# Patient Record
Sex: FEMALE | Race: WHITE | HISPANIC OR LATINO | Employment: FULL TIME | URBAN - METROPOLITAN AREA
[De-identification: names, ages, dates, MRNs, and addresses within clinical notes are randomized per-mention and may not be internally consistent; named-entity substitution may affect disease eponyms.]

---

## 2019-01-17 ENCOUNTER — APPOINTMENT (OUTPATIENT)
Dept: RADIOLOGY | Facility: MEDICAL CENTER | Age: 29
End: 2019-01-17

## 2019-01-17 ENCOUNTER — HOSPITAL ENCOUNTER (EMERGENCY)
Facility: MEDICAL CENTER | Age: 29
End: 2019-01-17
Attending: EMERGENCY MEDICINE

## 2019-01-17 VITALS
RESPIRATION RATE: 16 BRPM | SYSTOLIC BLOOD PRESSURE: 121 MMHG | BODY MASS INDEX: 24.71 KG/M2 | TEMPERATURE: 97.3 F | HEART RATE: 89 BPM | HEIGHT: 67 IN | WEIGHT: 157.41 LBS | OXYGEN SATURATION: 100 % | DIASTOLIC BLOOD PRESSURE: 66 MMHG

## 2019-01-17 DIAGNOSIS — R10.30 LOWER ABDOMINAL PAIN: ICD-10-CM

## 2019-01-17 DIAGNOSIS — O23.42 URINARY TRACT INFECTION IN PREGNANCY IN SECOND TRIMESTER, ANTEPARTUM: ICD-10-CM

## 2019-01-17 DIAGNOSIS — Z3A.17 17 WEEKS GESTATION OF PREGNANCY: ICD-10-CM

## 2019-01-17 LAB
ALBUMIN SERPL BCP-MCNC: 3.8 G/DL (ref 3.2–4.9)
ALBUMIN/GLOB SERPL: 1.1 G/DL
ALP SERPL-CCNC: 47 U/L (ref 30–99)
ALT SERPL-CCNC: 13 U/L (ref 2–50)
ANION GAP SERPL CALC-SCNC: 5 MMOL/L (ref 0–11.9)
APPEARANCE UR: ABNORMAL
AST SERPL-CCNC: 16 U/L (ref 12–45)
B-HCG SERPL-ACNC: ABNORMAL MIU/ML (ref 0–5)
BACTERIA #/AREA URNS HPF: ABNORMAL /HPF
BASOPHILS # BLD AUTO: 0.3 % (ref 0–1.8)
BASOPHILS # BLD: 0.03 K/UL (ref 0–0.12)
BILIRUB SERPL-MCNC: 0.3 MG/DL (ref 0.1–1.5)
BILIRUB UR QL STRIP.AUTO: NEGATIVE
BUN SERPL-MCNC: 10 MG/DL (ref 8–22)
CALCIUM SERPL-MCNC: 8.9 MG/DL (ref 8.5–10.5)
CHLORIDE SERPL-SCNC: 106 MMOL/L (ref 96–112)
CO2 SERPL-SCNC: 22 MMOL/L (ref 20–33)
COLOR UR: YELLOW
CREAT SERPL-MCNC: 0.62 MG/DL (ref 0.5–1.4)
EOSINOPHIL # BLD AUTO: 0.12 K/UL (ref 0–0.51)
EOSINOPHIL NFR BLD: 1.2 % (ref 0–6.9)
EPI CELLS #/AREA URNS HPF: ABNORMAL /HPF
ERYTHROCYTE [DISTWIDTH] IN BLOOD BY AUTOMATED COUNT: 48.4 FL (ref 35.9–50)
GLOBULIN SER CALC-MCNC: 3.4 G/DL (ref 1.9–3.5)
GLUCOSE SERPL-MCNC: 77 MG/DL (ref 65–99)
GLUCOSE UR STRIP.AUTO-MCNC: NEGATIVE MG/DL
HCT VFR BLD AUTO: 36.5 % (ref 37–47)
HGB BLD-MCNC: 11.8 G/DL (ref 12–16)
HYALINE CASTS #/AREA URNS LPF: ABNORMAL /LPF
IMM GRANULOCYTES # BLD AUTO: 0.04 K/UL (ref 0–0.11)
IMM GRANULOCYTES NFR BLD AUTO: 0.4 % (ref 0–0.9)
KETONES UR STRIP.AUTO-MCNC: NEGATIVE MG/DL
LEUKOCYTE ESTERASE UR QL STRIP.AUTO: ABNORMAL
LIPASE SERPL-CCNC: 34 U/L (ref 11–82)
LYMPHOCYTES # BLD AUTO: 2.07 K/UL (ref 1–4.8)
LYMPHOCYTES NFR BLD: 21 % (ref 22–41)
MCH RBC QN AUTO: 26.5 PG (ref 27–33)
MCHC RBC AUTO-ENTMCNC: 32.3 G/DL (ref 33.6–35)
MCV RBC AUTO: 81.8 FL (ref 81.4–97.8)
MICRO URNS: ABNORMAL
MONOCYTES # BLD AUTO: 0.9 K/UL (ref 0–0.85)
MONOCYTES NFR BLD AUTO: 9.1 % (ref 0–13.4)
NEUTROPHILS # BLD AUTO: 6.7 K/UL (ref 2–7.15)
NEUTROPHILS NFR BLD: 68 % (ref 44–72)
NITRITE UR QL STRIP.AUTO: POSITIVE
NRBC # BLD AUTO: 0 K/UL
NRBC BLD-RTO: 0 /100 WBC
PH UR STRIP.AUTO: 6.5 [PH]
PLATELET # BLD AUTO: 195 K/UL (ref 164–446)
PMV BLD AUTO: 11.5 FL (ref 9–12.9)
POTASSIUM SERPL-SCNC: 4 MMOL/L (ref 3.6–5.5)
PROT SERPL-MCNC: 7.2 G/DL (ref 6–8.2)
PROT UR QL STRIP: NEGATIVE MG/DL
RBC # BLD AUTO: 4.46 M/UL (ref 4.2–5.4)
RBC # URNS HPF: ABNORMAL /HPF
RBC UR QL AUTO: NEGATIVE
SODIUM SERPL-SCNC: 133 MMOL/L (ref 135–145)
SP GR UR STRIP.AUTO: 1.02
UROBILINOGEN UR STRIP.AUTO-MCNC: 0.2 MG/DL
WBC # BLD AUTO: 9.9 K/UL (ref 4.8–10.8)
WBC #/AREA URNS HPF: ABNORMAL /HPF

## 2019-01-17 PROCEDURE — 80053 COMPREHEN METABOLIC PANEL: CPT

## 2019-01-17 PROCEDURE — 76815 OB US LIMITED FETUS(S): CPT

## 2019-01-17 PROCEDURE — 700102 HCHG RX REV CODE 250 W/ 637 OVERRIDE(OP): Performed by: EMERGENCY MEDICINE

## 2019-01-17 PROCEDURE — 83690 ASSAY OF LIPASE: CPT

## 2019-01-17 PROCEDURE — 81001 URINALYSIS AUTO W/SCOPE: CPT

## 2019-01-17 PROCEDURE — 99284 EMERGENCY DEPT VISIT MOD MDM: CPT

## 2019-01-17 PROCEDURE — 85025 COMPLETE CBC W/AUTO DIFF WBC: CPT

## 2019-01-17 PROCEDURE — 84702 CHORIONIC GONADOTROPIN TEST: CPT

## 2019-01-17 PROCEDURE — 87077 CULTURE AEROBIC IDENTIFY: CPT

## 2019-01-17 PROCEDURE — 87186 SC STD MICRODIL/AGAR DIL: CPT

## 2019-01-17 PROCEDURE — 87086 URINE CULTURE/COLONY COUNT: CPT

## 2019-01-17 PROCEDURE — A9270 NON-COVERED ITEM OR SERVICE: HCPCS | Performed by: EMERGENCY MEDICINE

## 2019-01-17 RX ORDER — NITROFURANTOIN 25; 75 MG/1; MG/1
100 CAPSULE ORAL 2 TIMES DAILY
Qty: 14 CAP | Refills: 0 | Status: SHIPPED | OUTPATIENT
Start: 2019-01-17 | End: 2019-01-24

## 2019-01-17 RX ORDER — CEPHALEXIN 500 MG/1
500 CAPSULE ORAL ONCE
Status: DISCONTINUED | OUTPATIENT
Start: 2019-01-17 | End: 2019-01-17

## 2019-01-17 RX ORDER — NITROFURANTOIN 25; 75 MG/1; MG/1
100 CAPSULE ORAL ONCE
Status: COMPLETED | OUTPATIENT
Start: 2019-01-17 | End: 2019-01-17

## 2019-01-17 RX ADMIN — NITROFURANTOIN MONOHYDRATE/MACROCRYSTALLINE 100 MG: 25; 75 CAPSULE ORAL at 20:53

## 2019-01-17 NOTE — LETTER
1/19/2019               Jessa Parsons  [id number]  [place] [address]        Dear Jessa (MR#[id number])    This letter is sent in regards to your, recent visit to the [facility] Emergency Department on 1/17/2019.  During the visit, tests were performed to assist the physician in a medical diagnosis.  A review of those tests requires that we notify you of the following:    Your urine culture was POSITIVE for a bacteria called Escherichia coli. The antibiotic prescribed for you (nitrofurantoin) should be active to treat this bacteria. IT IS IMPORTANT THAT YOU CONTINUE TAKING YOUR ANTIBIOTIC UNTIL IT IS FINISHED.      Please feel free to contact me at the number below if you have any questions or concerns. Thank you for your cooperation in the matter.    Sincerely,  ED Culture Follow-Up Staff  Sachin Borjas, PharmD    [facility], Emergency Department  [address] [place] [id number]  [phone] (ED Culture Line)  [phone]

## 2019-01-18 NOTE — ED PROVIDER NOTES
ED Provider Note    Scribed for Ji Flores M.D. by Joshua Nix. 2019  8:04 PM    Means of arrival: Walk in  History obtained from: Patient  History limited by: None    CHIEF COMPLAINT  Chief Complaint   Patient presents with   • Abdominal Cramping     since last night    • Pregnancy     positive home pregnancy test 2 days ago        HPI    Jessa Parsons is a 28 y.o. female A0 17 weeks pregnant by dates presenting with intermittent lower abdominal cramping onset yesterday. She states the episodes of pain last approximately 4 hours and the pain is a 4/10 in severity. The patient additionally reports to have had right lower back pain yesterday, but it has since resolved. No alleviating or exacerbating factors are identified. She denies associated dysuria, vaginal bleeding, vaginal discharge, vomiting, diarrhea, fever, or rash. The patient recently found out she was pregnant 3 days ago after she had a positive at home pregnancy test. She has received prenatal care yet, but states her last menstrual period was 18. The patient reports no complications with prior pregnancies.     REVIEW OF SYSTEMS  See HPI for further details.   Pertinent positives include: lower abdominal cramping.  Pertinent negative include: dysuria, vaginal bleeding, vaginal discharge, vomiting, diarrhea, fever, or rash.  10 + review of systems otherwise negative     PAST MEDICAL HISTORY   Anemia    SOCIAL HISTORY  Social History     Social History Main Topics   • Smoking status: Never Smoker   • Smokeless tobacco: Never Used   • Alcohol use No   • Drug use: No       SURGICAL HISTORY  patient denies any surgical history    CURRENT MEDICATIONS  Home Medications     Reviewed by Dorothy Syed R.N. (Registered Nurse) on 19 at 1618  Med List Status: Complete   Medication Last Dose Status        Patient Raciel Taking any Medications                       ALLERGIES  No Known Allergies    PHYSICAL EXAM  VITAL SIGNS: /63    "Pulse 93   Temp 36.3 °C (97.3 °F) (Temporal)   Resp 16   Ht 1.702 m (5' 7\")   Wt 71.4 kg (157 lb 6.5 oz)   SpO2 100%   BMI 24.65 kg/m²    SpO2: I interpret this pulse oximetry as normal  Constitutional: Well developed, Well nourished, No distress, Non-toxic appearance.   HENT: Normocephalic, Atraumatic, Bilateral external ears normal, Oropharynx moist, No oral exudates.   Eyes: PERRLA, EOMI, Conjunctiva normal, No discharge.   Neck: No tenderness, Supple, No stridor.   Lymphatic: No lymphadenopathy noted.   Cardiovascular: Normal heart rate, Normal rhythm.   Thorax & Lungs: Clear to auscultation bilaterally, No respiratory distress, No wheezing, No crackles.   Abdomen: Soft, Slight lower abdominal tenderness, No masses, No pulsatile masses, no peritoneal signs.   Skin: Warm, Dry, No erythema, No rash.   Extremities:, No edema No cyanosis.   Musculoskeletal: No tenderness to palpation or major deformities noted.  Intact distal pulses  Neurologic: Awake, alert. Moves all extremities spontaneously.  Psychiatric: Affect normal, Judgment normal, Mood normal.     DIAGNOSTIC STUDIES / PROCEDURES    LABORATORY  Results for orders placed or performed during the hospital encounter of 01/17/19   CBC WITH DIFFERENTIAL   Result Value Ref Range    WBC 9.9 4.8 - 10.8 K/uL    RBC 4.46 4.20 - 5.40 M/uL    Hemoglobin 11.8 (L) 12.0 - 16.0 g/dL    Hematocrit 36.5 (L) 37.0 - 47.0 %    MCV 81.8 81.4 - 97.8 fL    MCH 26.5 (L) 27.0 - 33.0 pg    MCHC 32.3 (L) 33.6 - 35.0 g/dL    RDW 48.4 35.9 - 50.0 fL    Platelet Count 195 164 - 446 K/uL    MPV 11.5 9.0 - 12.9 fL    Neutrophils-Polys 68.00 44.00 - 72.00 %    Lymphocytes 21.00 (L) 22.00 - 41.00 %    Monocytes 9.10 0.00 - 13.40 %    Eosinophils 1.20 0.00 - 6.90 %    Basophils 0.30 0.00 - 1.80 %    Immature Granulocytes 0.40 0.00 - 0.90 %    Nucleated RBC 0.00 /100 WBC    Neutrophils (Absolute) 6.70 2.00 - 7.15 K/uL    Lymphs (Absolute) 2.07 1.00 - 4.80 K/uL    Monos (Absolute) 0.90 (H) " 0.00 - 0.85 K/uL    Eos (Absolute) 0.12 0.00 - 0.51 K/uL    Baso (Absolute) 0.03 0.00 - 0.12 K/uL    Immature Granulocytes (abs) 0.04 0.00 - 0.11 K/uL    NRBC (Absolute) 0.00 K/uL   COMP METABOLIC PANEL   Result Value Ref Range    Sodium 133 (L) 135 - 145 mmol/L    Potassium 4.0 3.6 - 5.5 mmol/L    Chloride 106 96 - 112 mmol/L    Co2 22 20 - 33 mmol/L    Anion Gap 5.0 0.0 - 11.9    Glucose 77 65 - 99 mg/dL    Bun 10 8 - 22 mg/dL    Creatinine 0.62 0.50 - 1.40 mg/dL    Calcium 8.9 8.5 - 10.5 mg/dL    AST(SGOT) 16 12 - 45 U/L    ALT(SGPT) 13 2 - 50 U/L    Alkaline Phosphatase 47 30 - 99 U/L    Total Bilirubin 0.3 0.1 - 1.5 mg/dL    Albumin 3.8 3.2 - 4.9 g/dL    Total Protein 7.2 6.0 - 8.2 g/dL    Globulin 3.4 1.9 - 3.5 g/dL    A-G Ratio 1.1 g/dL   LIPASE   Result Value Ref Range    Lipase 34 11 - 82 U/L   HCG QUANTITATIVE   Result Value Ref Range    Bhcg 70265.8 (H) 0.0 - 5.0 mIU/mL   URINALYSIS,CULTURE IF INDICATED   Result Value Ref Range    Color Yellow     Character Cloudy (A)     Specific Gravity 1.020 <1.035    Ph 6.5 5.0 - 8.0    Glucose Negative Negative mg/dL    Ketones Negative Negative mg/dL    Protein Negative Negative mg/dL    Bilirubin Negative Negative    Urobilinogen, Urine 0.2 Negative    Nitrite Positive (A) Negative    Leukocyte Esterase Moderate (A) Negative    Occult Blood Negative Negative    Micro Urine Req Microscopic    ESTIMATED GFR   Result Value Ref Range    GFR If African American >60 >60 mL/min/1.73 m 2    GFR If Non African American >60 >60 mL/min/1.73 m 2   URINE MICROSCOPIC (W/UA)   Result Value Ref Range    WBC 20-50 (A) /hpf    RBC 0-2 /hpf    Bacteria Many (A) None /hpf    Epithelial Cells Moderate (A) /hpf    Hyaline Cast 3-5 (A) /lpf       RADIOLOGY    US-OB LIMITED TRANSABDOMINAL   Final Result      Single intrauterine pregnancy of an estimated gestational age of 17 weeks 1 day with an estimated date of delivery of 6/26/2019. Clinical CAYLA is 7/2/2019      Fetal survey within  "normal limits.              CHART REVIEW  Pertinent medical chart information was reviewed and reveals: No recent pertinent encounters    COURSE & MEDICAL DECISION MAKING  Pertinent Labs & Imaging studies reviewed. (See chart for details)    Differential diagnoses include but not limited to: UTI, pyonephritis, renal stone, gastroenteritis, ovarian torsion, ovarian cyst, pregnancy, ectopic pregnancy    8:04 PM - Patient seen and examined at bedside. Discussed plan of care, including lab and imaging. Patient agrees to the plan of care. Ordered for US-OB Transabdominal, Estimated GFR, CBC with differential, CMP, Lipase, HCG Qual, and Urinalysis to evaluate her symptoms. She is informed lab work thus far is reassuring, but will continue to wait for rest of labs to result.    8:51 PM - Patient reevaluated.  She is resting comfortably in bed with stable vital signs. The patient was updated with remaining lab results that indicate UTI. She will be discharged home with prescription for Macrobid. The patient is additionally recommended to start taking prenatal vitamins, and will be given referral to pregnancy center. She is instructed to return for vaginal bleeding, worsening abdominal pain, or any other concerning symptoms. Patient is understanding and agreeable to discharge.     Vitals:    01/17/19 1609 01/17/19 1616 01/17/19 1809 01/17/19 2100   BP: 115/56  115/63 121/66   Pulse: (!) 106  93 89   Resp: 18  16 16   Temp: 36.3 °C (97.3 °F)      TempSrc: Temporal      SpO2: 95%  100%    Weight:  71.4 kg (157 lb 6.5 oz)     Height: 1.702 m (5' 7\")          Medications   nitrofurantoin monohydr macro (MACROBID) capsule CAPS 100 mg (100 mg Oral Given 1/17/19 2053)       28-year-old previous healthy female presenting for abdominal cramping, intermittent.  No current GI/ symptoms.  Vital signs and exam are reassuring, no signs of acute surgical abdomen, no specific pain on exam.  There is a test is not positive.  Ultrasound " shows normal pregnancy, approximately 17 weeks.  Patient states she is chronically taking prenatal vitamins for chronic anemia.  Did not know she was pregnant.  Urinalysis shows signs of infection.  Given dose of antibiotics here.  No evidence of pyelonephritis at this time.  Patient tolerating p.o. and feeling well.  No significant pain during course.  Does states that she was taking oral contraceptives, strongly advised her to discontinue this, is very knowledged on prenatal care from previous pregnancies.  Currently without any vaginal bleeding, no evidence of sepsis pyonephritis, comfortable and well-appearing.  Left message with outpatient  for pregnancy center follow-up.  Patient or guardian given strict returns precautions and care instructions.  Advised PCP follow-up in 1-2 days.  Patient or guardian expresses understanding and agrees to plan.    DISPOSITION  The patient will return for new or worsening symptoms and is stable at the time of discharge.    DISPOSITION:  Patient will be discharged home in stable condition.    FOLLOW UP:  [facility]  [address] [place] [id number]  [phone]  Schedule an appointment as soon as possible for a visit in 1 day  You should receive a call from our outpatient  in the next day, please call the pregnancy center if you do not receive a phone call tomorrow.  Return to the emergency department for any new or worsening symptoms.      OUTPATIENT MEDICATIONS:  Discharge Medication List as of 1/17/2019  8:55 PM      START taking these medications    Details   nitrofurantoin monohydr macro (MACROBID) 100 MG Cap Take 1 Cap by mouth 2 times a day for 7 days., Disp-14 Cap, R-0, Print Rx Paper             FINAL IMPRESSION  Visit Diagnoses     ICD-10-CM   1. 17 weeks gestation of pregnancy Z3A.17   2. Lower abdominal pain R10.30   3. Urinary tract infection in pregnancy in second trimester, antepartum O23.42        IJoshua (Scribanette),  am scribing for, and in the presence of, Ji Flores M.D..    Electronically signed by: Joshua Nix (Scribe), 1/17/2019    IJi M.D. personally performed the services described in this documentation, as scribed by Joshua Nix in my presence, and it is both accurate and complete.    C.    The note accurately reflects work and decisions made by me.  Ji Flores  1/18/2019  1:01 AM

## 2019-01-18 NOTE — ED TRIAGE NOTES
Pt to triage .  Chief Complaint   Patient presents with   • Abdominal Cramping     since last night    • Pregnancy     positive home pregnancy test 2 days ago

## 2019-01-18 NOTE — ED NOTES
Pt ambulated from waiting room without difficulty. States that her abd pain is still present. All tests already done, chart up for ERP eval

## 2019-01-19 LAB
BACTERIA UR CULT: ABNORMAL
BACTERIA UR CULT: ABNORMAL
SIGNIFICANT IND 70042: ABNORMAL
SITE SITE: ABNORMAL
SOURCE SOURCE: ABNORMAL

## 2019-01-19 NOTE — ED NOTES
"ED Positive Culture Follow-up/Notification Note:    Date: 1/19/19     Patient seen in the ED on 1/17/2019 for abdominal cramping.   1. 17 weeks gestation of pregnancy    2. Lower abdominal pain    3. Urinary tract infection in pregnancy in second trimester, antepartum       Discharge Medication List as of 1/17/2019  8:55 PM      START taking these medications    Details   nitrofurantoin monohydr macro (MACROBID) 100 MG Cap Take 1 Cap by mouth 2 times a day for 7 days., Disp-14 Cap, R-0, Print Rx Paper             Allergies: Patient has no known allergies.     Vitals:    01/17/19 1609 01/17/19 1616 01/17/19 1809 01/17/19 2100   BP: 115/56  115/63 121/66   Pulse: (!) 106  93 89   Resp: 18  16 16   Temp: 36.3 °C (97.3 °F)      TempSrc: Temporal      SpO2: 95%  100%    Weight:  71.4 kg (157 lb 6.5 oz)     Height: 1.702 m (5' 7\")          Final cultures:   Results     Procedure Component Value Units Date/Time    URINE CULTURE(NEW) [[id number]]  (Abnormal)  (Susceptibility) Collected:  01/17/19 2028    Order Status:  Completed Specimen:  Urine Updated:  01/19/19 0900     Significant Indicator POS (POS)     Source UR     Site -     Urine Culture - (A)      Escherichia coli  >100,000 cfu/mL   (A)    Culture & Susceptibility     ESCHERICHIA COLI     Antibiotic Sensitivity Microscan Unit Status    Ampicillin Sensitive <=8 mcg/mL Final    Method: SENSITIVITY, MAAME    Cefepime Sensitive <=8 mcg/mL Final    Method: SENSITIVITY, MAAME    Cefotaxime Sensitive <=2 mcg/mL Final    Method: SENSITIVITY, MAAME    Cefotetan Sensitive <=16 mcg/mL Final    Method: SENSITIVITY, MAAME    Ceftazidime Sensitive <=1 mcg/mL Final    Method: SENSITIVITY, MAAME    Ceftriaxone Sensitive <=8 mcg/mL Final    Method: SENSITIVITY, MAAME    Cefuroxime Sensitive <=4 mcg/mL Final    Method: SENSITIVITY, MAAME    Cephalothin Intermediate 16 mcg/mL Final    Method: SENSITIVITY, MAAME    Ciprofloxacin Sensitive <=1 mcg/mL Final    Method: SENSITIVITY, MAAME    Gentamicin " Sensitive <=4 mcg/mL Final    Method: SENSITIVITY, MAAME    Levofloxacin Sensitive <=2 mcg/mL Final    Method: SENSITIVITY, MAAME    Nitrofurantoin Sensitive <=32 mcg/mL Final    Method: SENSITIVITY, MAAME    Pip/Tazobactam Sensitive <=16 mcg/mL Final    Method: SENSITIVITY, MAAME    Piperacillin Sensitive <=16 mcg/mL Final    Method: SENSITIVITY, MAAME    Tigecycline Sensitive <=2 mcg/mL Final    Method: SENSITIVITY, MAAME    Tobramycin Sensitive <=4 mcg/mL Final    Method: SENSITIVITY, MAAME    Trimeth/Sulfa Sensitive <=2/38 mcg/mL Final    Method: SENSITIVITY, MAAME                       URINALYSIS,CULTURE IF INDICATED [[id number]]  (Abnormal) Collected:  01/17/19 2028    Order Status:  Completed Specimen:  Blood Updated:  01/17/19 2043     Color Yellow     Character Cloudy (A)     Specific Gravity 1.020     Ph 6.5     Glucose Negative mg/dL      Ketones Negative mg/dL      Protein Negative mg/dL      Bilirubin Negative     Urobilinogen, Urine 0.2     Nitrite Positive (A)     Leukocyte Esterase Moderate (A)     Occult Blood Negative     Micro Urine Req Microscopic          Plan:   Appropriate antibiotic therapy prescribed. No changes required based upon culture result.  Sent letter to patient to notify of positive culture result and encourage compliance with prescribed antibiotics.     Sachin Borjas, PharmD

## 2019-01-23 PROBLEM — Z34.00 PREGNANCY, SUPERVISION OF FIRST: Status: ACTIVE | Noted: 2019-01-23

## 2019-01-23 PROBLEM — Z34.80 SUPERVISION OF OTHER NORMAL PREGNANCY, ANTEPARTUM: Status: ACTIVE | Noted: 2019-01-23

## 2019-02-13 ENCOUNTER — APPOINTMENT (OUTPATIENT)
Dept: OBGYN | Facility: CLINIC | Age: 29
End: 2019-02-13

## 2019-02-26 ENCOUNTER — HOSPITAL ENCOUNTER (OUTPATIENT)
Dept: LAB | Facility: MEDICAL CENTER | Age: 29
End: 2019-02-26
Attending: NURSE PRACTITIONER
Payer: COMMERCIAL

## 2019-02-26 ENCOUNTER — INITIAL PRENATAL (OUTPATIENT)
Dept: OBGYN | Facility: CLINIC | Age: 29
End: 2019-02-26

## 2019-02-26 ENCOUNTER — HOSPITAL ENCOUNTER (OUTPATIENT)
Facility: MEDICAL CENTER | Age: 29
End: 2019-02-26
Attending: NURSE PRACTITIONER
Payer: COMMERCIAL

## 2019-02-26 VITALS
SYSTOLIC BLOOD PRESSURE: 102 MMHG | BODY MASS INDEX: 21.86 KG/M2 | HEIGHT: 72 IN | WEIGHT: 161.38 LBS | DIASTOLIC BLOOD PRESSURE: 58 MMHG

## 2019-02-26 DIAGNOSIS — Z34.80 SUPERVISION OF OTHER NORMAL PREGNANCY, ANTEPARTUM: ICD-10-CM

## 2019-02-26 DIAGNOSIS — Z34.80 SUPERVISION OF OTHER NORMAL PREGNANCY, ANTEPARTUM: Primary | ICD-10-CM

## 2019-02-26 DIAGNOSIS — O09.30 LATE PRENATAL CARE AFFECTING PREGNANCY, ANTEPARTUM: ICD-10-CM

## 2019-02-26 DIAGNOSIS — O09.299 HISTORY OF RETAINED PLACENTA IN PRIOR PREGNANCY, CURRENTLY PREGNANT: ICD-10-CM

## 2019-02-26 LAB
ABO GROUP BLD: NORMAL
AMORPH CRY #/AREA URNS HPF: PRESENT /HPF
APPEARANCE UR: ABNORMAL
APPEARANCE UR: NORMAL
BACTERIA #/AREA URNS HPF: ABNORMAL /HPF
BASOPHILS # BLD AUTO: 0.4 % (ref 0–1.8)
BASOPHILS # BLD: 0.04 K/UL (ref 0–0.12)
BILIRUB UR QL STRIP.AUTO: NEGATIVE
BILIRUB UR STRIP-MCNC: NORMAL MG/DL
BLD GP AB SCN SERPL QL: NORMAL
CAOX CRY #/AREA URNS HPF: ABNORMAL /HPF
COLOR UR AUTO: NORMAL
COLOR UR: YELLOW
EOSINOPHIL # BLD AUTO: 0.08 K/UL (ref 0–0.51)
EOSINOPHIL NFR BLD: 0.7 % (ref 0–6.9)
EPI CELLS #/AREA URNS HPF: ABNORMAL /HPF
ERYTHROCYTE [DISTWIDTH] IN BLOOD BY AUTOMATED COUNT: 53.1 FL (ref 35.9–50)
GLUCOSE UR STRIP.AUTO-MCNC: NEGATIVE MG/DL
GLUCOSE UR STRIP.AUTO-MCNC: NORMAL MG/DL
HBV SURFACE AG SER QL: NEGATIVE
HCT VFR BLD AUTO: 41.2 % (ref 37–47)
HGB BLD-MCNC: 13.3 G/DL (ref 12–16)
HIV 1+2 AB+HIV1 P24 AG SERPL QL IA: NON REACTIVE
HYALINE CASTS #/AREA URNS LPF: ABNORMAL /LPF
IMM GRANULOCYTES # BLD AUTO: 0.04 K/UL (ref 0–0.11)
IMM GRANULOCYTES NFR BLD AUTO: 0.4 % (ref 0–0.9)
KETONES UR STRIP.AUTO-MCNC: NEGATIVE MG/DL
KETONES UR STRIP.AUTO-MCNC: NORMAL MG/DL
LEUKOCYTE ESTERASE UR QL STRIP.AUTO: ABNORMAL
LEUKOCYTE ESTERASE UR QL STRIP.AUTO: NORMAL
LYMPHOCYTES # BLD AUTO: 1.8 K/UL (ref 1–4.8)
LYMPHOCYTES NFR BLD: 16.6 % (ref 22–41)
MCH RBC QN AUTO: 28.3 PG (ref 27–33)
MCHC RBC AUTO-ENTMCNC: 32.3 G/DL (ref 33.6–35)
MCV RBC AUTO: 87.7 FL (ref 81.4–97.8)
MICRO URNS: ABNORMAL
MONOCYTES # BLD AUTO: 0.89 K/UL (ref 0–0.85)
MONOCYTES NFR BLD AUTO: 8.2 % (ref 0–13.4)
NEUTROPHILS # BLD AUTO: 8.02 K/UL (ref 2–7.15)
NEUTROPHILS NFR BLD: 73.7 % (ref 44–72)
NITRITE UR QL STRIP.AUTO: NEGATIVE
NITRITE UR QL STRIP.AUTO: NORMAL
NRBC # BLD AUTO: 0 K/UL
NRBC BLD-RTO: 0 /100 WBC
PH UR STRIP.AUTO: 5 [PH]
PH UR STRIP.AUTO: 5 [PH] (ref 5–8)
PLATELET # BLD AUTO: 198 K/UL (ref 164–446)
PMV BLD AUTO: 12.2 FL (ref 9–12.9)
PROT UR QL STRIP: NEGATIVE MG/DL
PROT UR QL STRIP: NORMAL MG/DL
RBC # BLD AUTO: 4.7 M/UL (ref 4.2–5.4)
RBC # URNS HPF: ABNORMAL /HPF
RBC UR QL AUTO: NEGATIVE
RBC UR QL AUTO: NORMAL
RH BLD: NORMAL
RUBV AB SER QL: 484.9 IU/ML
SP GR UR STRIP.AUTO: 1.02
SP GR UR STRIP.AUTO: 1.03
TREPONEMA PALLIDUM IGG+IGM AB [PRESENCE] IN SERUM OR PLASMA BY IMMUNOASSAY: NON REACTIVE
UROBILINOGEN UR STRIP-MCNC: NORMAL MG/DL
UROBILINOGEN UR STRIP.AUTO-MCNC: 0.2 MG/DL
WBC # BLD AUTO: 10.9 K/UL (ref 4.8–10.8)
WBC #/AREA URNS HPF: ABNORMAL /HPF

## 2019-02-26 PROCEDURE — 8198 PREG CTR PKG RATE (SYSTEM): Performed by: NURSE PRACTITIONER

## 2019-02-26 PROCEDURE — 90471 IMMUNIZATION ADMIN: CPT | Performed by: NURSE PRACTITIONER

## 2019-02-26 PROCEDURE — 0500F INITIAL PRENATAL CARE VISIT: CPT | Performed by: NURSE PRACTITIONER

## 2019-02-26 PROCEDURE — 81002 URINALYSIS NONAUTO W/O SCOPE: CPT | Performed by: NURSE PRACTITIONER

## 2019-02-26 PROCEDURE — 90686 IIV4 VACC NO PRSV 0.5 ML IM: CPT | Performed by: NURSE PRACTITIONER

## 2019-02-26 ASSESSMENT — ENCOUNTER SYMPTOMS
GASTROINTESTINAL NEGATIVE: 1
CARDIOVASCULAR NEGATIVE: 1
NEUROLOGICAL NEGATIVE: 1
CONSTITUTIONAL NEGATIVE: 1
MUSCULOSKELETAL NEGATIVE: 1
PSYCHIATRIC NEGATIVE: 1
RESPIRATORY NEGATIVE: 1
EYES NEGATIVE: 1

## 2019-02-26 NOTE — PROGRESS NOTES
S:  Eloina Galicia is a 28 y.o.  who presents for her new OB exam.  She is 22w6d with and CAYLA of Estimated Date of Delivery: 19 based off of LMP . She has no complaints.  She is currently working at home. Discussed heavy lifting and chemical exposure. No ER visits or previous care in this pregnancy.     Desires AFP.  Declines CF.  Denies VB, LOF, or cramping.  Denies dysuria, vaginal DC. Reports good fetal movement.     Pt is  and lives with  and kids. This is a new FOB than her other children.  Pregnancy is unplanned but desired. She was on OCP's when she conceived.    She has a history of 2 full term 's. States that she had retained placenta with the second pregnancy and she stayed in the hospital for a few more days. Denies any other problems.      Discussed diet and exercise during pregnancy. Encouraged good nutrition, and daily exercise including walking or swimming. Discussed expected weight gain during pregnancy. Discussed adequate hydration during pregnancy.    Past Medical History:   Diagnosis Date   • Anemia     when pregnant    • Head ache 02/10/2019    during this pregnancy     Family History   Problem Relation Age of Onset   • Diabetes Mother    • No Known Problems Maternal Grandmother    • No Known Problems Maternal Grandfather    • No Known Problems Paternal Grandmother    • No Known Problems Paternal Grandfather      Social History     Social History   • Marital status: Single     Spouse name: N/A   • Number of children: N/A   • Years of education: N/A     Occupational History   • Not on file.     Social History Main Topics   • Smoking status: Never Smoker   • Smokeless tobacco: Never Used   • Alcohol use No   • Drug use: No   • Sexual activity: Yes     Partners: Male     Birth control/ protection: Pill     Other Topics Concern   • Not on file     Social History Narrative   • No narrative on file     OB History    Para Term  AB Living   3 2 2      "2   SAB TAB Ectopic Molar Multiple Live Births             2      # Outcome Date GA Lbr Mil/2nd Weight Sex Delivery Anes PTL Lv   3 Current            2 Term 09/13/13 38w0d  3.43 kg (7 lb 9 oz) F Vag-Spont EPI N JORGE      Complications: Other Excessive Bleeding   1 Term 02/05/05 40w0d  3.5 kg (7 lb 11.5 oz) M Vag-Spont None N JORGE          History of Varicella Virus: no  History of HSV I or II in self or partner: no  History of Thyroid problems: no    O:  Blood pressure 102/58, height 1.867 m (6' 1.5\"), weight 73.2 kg (161 lb 6 oz), last menstrual period 09/16/2018.   See Prenatal Physical.    Wet mount: deferred, no s/sx  Chaperone offered: yes and present    A:   1.  IUP @ 22w6d per LMP        2.  S=D        3.  See problem list below        4.  Late prenatal care       Patient Active Problem List    Diagnosis Date Noted   • Supervision of other normal pregnancy 01/23/2019         P:  1.  GC/CT & pap done        2.  Prenatal labs ordered - lab slip given        3.  Discussed PNV, diet, avoidances and adequate water intake        4.  NOB packet given        5.  Return to office in 4 wks        6.  Complete OB US at next available        7.  AFP now    No orders of the defined types were placed in this encounter.      HPI    Review of Systems   Constitutional: Negative.    HENT: Negative.    Eyes: Negative.    Respiratory: Negative.    Cardiovascular: Negative.    Gastrointestinal: Negative.    Genitourinary: Negative.    Musculoskeletal: Negative.    Skin: Negative.    Neurological: Negative.    Endo/Heme/Allergies: Negative.    Psychiatric/Behavioral: Negative.    All other systems reviewed and are negative.         Objective:     /58   Ht 1.867 m (6' 1.5\")   Wt 73.2 kg (161 lb 6 oz)   LMP 09/16/2018   BMI 21.00 kg/m²      Physical Exam   Constitutional: She is oriented to person, place, and time. She appears well-developed and well-nourished.   HENT:   Head: Normocephalic.   Nose: Nose normal.   Eyes: " Conjunctivae are normal.   Neck: Normal range of motion. Neck supple.   Cardiovascular: Normal rate, regular rhythm, normal heart sounds and intact distal pulses.    Pulmonary/Chest: Effort normal and breath sounds normal.   Abdominal: Soft. Bowel sounds are normal.   Genitourinary: Vagina normal. Uterus is enlarged.   Genitourinary Comments: Enlarged to approx 23 weeks   Musculoskeletal: Normal range of motion.   Neurological: She is alert and oriented to person, place, and time.   Skin: Skin is warm and dry. Capillary refill takes less than 2 seconds.   Psychiatric: She has a normal mood and affect. Her behavior is normal. Judgment and thought content normal.   Nursing note and vitals reviewed.       Assessment/Plan:     1. Supervision of other normal pregnancy  CAYLA 6/26/19 per LMP  - Consent for Cystic Fibrosis Carrier Test  - POCT Urinalysis  - PREG CNTR PRENATAL PN; Future  - THINPREP RFLX HPV ASCUS W/CTNG; Future  - AFP TETRA; Future  - Influenza Vaccine Quad Injection >3Y (PF)  - US-OB 2ND 3RD TRI COMPLETE; Future

## 2019-02-26 NOTE — PROGRESS NOTES
Pt here for NOB. Denies JANAY, ALEMF, UC. +FM. Pt c/o low back pain. Pt states she injured her low back in the past and that is where she has the pain. Pt states she feels dizzy when getting up first thing in the morning or if she standing for long periods of time.   Phone# [phone]  Pharmacy verified with pt.   Flu vaccine given.

## 2019-02-26 NOTE — LETTER
2019      Eloina Galicia is currently pregnant and being cared for by [facility].     She is medically cleared for:    1. Dental exams and routine cleaning  2. Tooth fillings and extractions as needed  3. Antibiotic therapy as appropriate  4. Local anesthesia  5. Abdominal shield for x-rays    Patient may be administered the followin% Lidocaine with 1:100,000 Epinephrine  4% Septocaine with 1:100,000 Epinephrine  Nitrous Oxide  A narcotic or non-narcotic pain medication  An antibiotic such as penicillin or clindamycin    NO TETRACYCLINE and NO CODEINE. NO IBUPROFEN        Thank you,          GARETH GeorgeNLORY.    Electronically Signed

## 2019-02-27 ENCOUNTER — HOSPITAL ENCOUNTER (OUTPATIENT)
Facility: MEDICAL CENTER | Age: 29
End: 2019-02-27
Attending: NURSE PRACTITIONER
Payer: COMMERCIAL

## 2019-02-27 ENCOUNTER — APPOINTMENT (OUTPATIENT)
Dept: RADIOLOGY | Facility: IMAGING CENTER | Age: 29
End: 2019-02-27
Attending: NURSE PRACTITIONER

## 2019-02-27 DIAGNOSIS — Z34.80 SUPERVISION OF OTHER NORMAL PREGNANCY, ANTEPARTUM: ICD-10-CM

## 2019-02-27 PROCEDURE — 76805 OB US >/= 14 WKS SNGL FETUS: CPT | Performed by: OBSTETRICS & GYNECOLOGY

## 2019-02-28 DIAGNOSIS — Z34.80 SUPERVISION OF OTHER NORMAL PREGNANCY, ANTEPARTUM: ICD-10-CM

## 2019-03-01 LAB
# FETUSES US: NORMAL
AFP MOM SERPL: 0.63
AFP SERPL-MCNC: 54 NG/ML
AGE - REPORTED: 30.1 YR
CURRENT SMOKER: NO
FAMILY MEMBER DISEASES HX: NO
GA METHOD: NORMAL
GA: NORMAL WK
HCG MOM SERPL: 0.47
HCG SERPL-ACNC: 8453 IU/L
HX OF HEREDITARY DISORDERS: NO
IDDM PATIENT QL: NO
INHIBIN A MOM SERPL: 0.53
INHIBIN A SERPL-MCNC: 127 PG/ML
INTEGRATED SCN PATIENT-IMP: NORMAL
PATHOLOGY STUDY: NORMAL
SPECIMEN DRAWN SERPL: NORMAL
U ESTRIOL MOM SERPL: 0.62
U ESTRIOL SERPL-MCNC: 1.93 NG/ML

## 2019-03-02 LAB
C TRACH DNA GENITAL QL NAA+PROBE: NEGATIVE
CYTOLOGY REG CYTOL: NORMAL
N GONORRHOEA DNA GENITAL QL NAA+PROBE: NEGATIVE
SPECIMEN SOURCE: NORMAL

## 2019-03-07 ENCOUNTER — TELEPHONE (OUTPATIENT)
Dept: OBGYN | Facility: MEDICAL CENTER | Age: 29
End: 2019-03-07

## 2019-03-07 NOTE — TELEPHONE ENCOUNTER
----- Message from Autumn Paul C.N.M. sent at 3/4/2019  4:01 AM PST -----  Please call patient with results, ask if she is symptomatic before we treat

## 2019-03-26 ENCOUNTER — ROUTINE PRENATAL (OUTPATIENT)
Dept: OBGYN | Facility: CLINIC | Age: 29
End: 2019-03-26

## 2019-03-26 VITALS — BODY MASS INDEX: 22.12 KG/M2 | SYSTOLIC BLOOD PRESSURE: 108 MMHG | WEIGHT: 170 LBS | DIASTOLIC BLOOD PRESSURE: 60 MMHG

## 2019-03-26 DIAGNOSIS — Z34.82 ENCOUNTER FOR SUPERVISION OF OTHER NORMAL PREGNANCY, SECOND TRIMESTER: Primary | ICD-10-CM

## 2019-03-26 PROCEDURE — 90040 PR PRENATAL FOLLOW UP: CPT | Performed by: NURSE PRACTITIONER

## 2019-03-26 NOTE — PROGRESS NOTES
SUBJECTIVE:  Pt is a 28 y.o.   at 26w6d  gestation. Presents today for follow-up prenatal care. Reports acid indigestion and vomit in the morning. Some lower back discomfort relieved by massage. Reports feeling good  fetal movement. Denies cramping/contractions, bleeding or leaking of fluid. Denies dysuria, headaches, N/V. Generally feels well today. Recent ER or L&D visits  - none.     OBJECTIVE:  - See prenatal vitals flow  -   Vitals:    19 1629   BP: 108/60   Weight: 77.1 kg (170 lb)      Fundal Height - 27  FHT - 140  US normal fetal survey   Prenatal labs normal pnp, normal AFP              ASSESSMENT:   - IUP at 26w6d    - S=D   -   Patient Active Problem List    Diagnosis Date Noted   • History of retained placenta in prior pregnancy, currently pregnant 2019   • Late prenatal care affecting pregnancy, antepartum 2019   • Supervision of other normal pregnancy 2019         PLAN:  - S/sx pregnancy and labor warning signs vs general discomforts discussed  - Fetal movements and kick counts reviewed   - Adequate hydration reinforced  - Nutrition/exercise/vitamin education; continued PNV  -  Lab slip and instructions for glucose testing  Acid medication discussed.

## 2019-03-26 NOTE — PROGRESS NOTES
Pt here today for OB follow up  Pt states having lower back pain   Reports +FM   Good # [phone]  Pharmacy Confirmed.  Chaperone offered and not indicated.

## 2019-03-28 ENCOUNTER — HOSPITAL ENCOUNTER (OUTPATIENT)
Dept: LAB | Facility: MEDICAL CENTER | Age: 29
End: 2019-03-28
Attending: NURSE PRACTITIONER
Payer: COMMERCIAL

## 2019-03-28 DIAGNOSIS — Z34.82 ENCOUNTER FOR SUPERVISION OF OTHER NORMAL PREGNANCY, SECOND TRIMESTER: ICD-10-CM

## 2019-03-28 LAB
BASOPHILS # BLD AUTO: 0.4 % (ref 0–1.8)
BASOPHILS # BLD: 0.04 K/UL (ref 0–0.12)
EOSINOPHIL # BLD AUTO: 0.1 K/UL (ref 0–0.51)
EOSINOPHIL NFR BLD: 1 % (ref 0–6.9)
ERYTHROCYTE [DISTWIDTH] IN BLOOD BY AUTOMATED COUNT: 49.8 FL (ref 35.9–50)
GLUCOSE 1H P 50 G GLC PO SERPL-MCNC: 81 MG/DL (ref 70–139)
HCT VFR BLD AUTO: 38.5 % (ref 37–47)
HGB BLD-MCNC: 12.5 G/DL (ref 12–16)
IMM GRANULOCYTES # BLD AUTO: 0.08 K/UL (ref 0–0.11)
IMM GRANULOCYTES NFR BLD AUTO: 0.8 % (ref 0–0.9)
LYMPHOCYTES # BLD AUTO: 1.71 K/UL (ref 1–4.8)
LYMPHOCYTES NFR BLD: 16.9 % (ref 22–41)
MCH RBC QN AUTO: 29.3 PG (ref 27–33)
MCHC RBC AUTO-ENTMCNC: 32.5 G/DL (ref 33.6–35)
MCV RBC AUTO: 90.4 FL (ref 81.4–97.8)
MONOCYTES # BLD AUTO: 0.96 K/UL (ref 0–0.85)
MONOCYTES NFR BLD AUTO: 9.5 % (ref 0–13.4)
NEUTROPHILS # BLD AUTO: 7.21 K/UL (ref 2–7.15)
NEUTROPHILS NFR BLD: 71.4 % (ref 44–72)
NRBC # BLD AUTO: 0 K/UL
NRBC BLD-RTO: 0 /100 WBC
PLATELET # BLD AUTO: 156 K/UL (ref 164–446)
PMV BLD AUTO: 11.9 FL (ref 9–12.9)
RBC # BLD AUTO: 4.26 M/UL (ref 4.2–5.4)
WBC # BLD AUTO: 10.1 K/UL (ref 4.8–10.8)

## 2019-03-29 LAB — TREPONEMA PALLIDUM IGG+IGM AB [PRESENCE] IN SERUM OR PLASMA BY IMMUNOASSAY: NON REACTIVE

## 2019-04-16 ENCOUNTER — ROUTINE PRENATAL (OUTPATIENT)
Dept: OBGYN | Facility: CLINIC | Age: 29
End: 2019-04-16

## 2019-04-16 VITALS — BODY MASS INDEX: 22.91 KG/M2 | SYSTOLIC BLOOD PRESSURE: 112 MMHG | DIASTOLIC BLOOD PRESSURE: 62 MMHG | WEIGHT: 176 LBS

## 2019-04-16 DIAGNOSIS — Z3A.29 29 WEEKS GESTATION OF PREGNANCY: ICD-10-CM

## 2019-04-16 DIAGNOSIS — M54.5 CHRONIC LOW BACK PAIN, UNSPECIFIED BACK PAIN LATERALITY, WITH SCIATICA PRESENCE UNSPECIFIED: ICD-10-CM

## 2019-04-16 DIAGNOSIS — G89.29 CHRONIC LOW BACK PAIN, UNSPECIFIED BACK PAIN LATERALITY, WITH SCIATICA PRESENCE UNSPECIFIED: ICD-10-CM

## 2019-04-16 PROBLEM — M54.9 CHRONIC BACK PAIN: Status: ACTIVE | Noted: 2019-04-16

## 2019-04-16 PROCEDURE — 90715 TDAP VACCINE 7 YRS/> IM: CPT | Performed by: NURSE PRACTITIONER

## 2019-04-16 PROCEDURE — 90471 IMMUNIZATION ADMIN: CPT | Performed by: NURSE PRACTITIONER

## 2019-04-16 PROCEDURE — 90040 PR PRENATAL FOLLOW UP: CPT | Performed by: NURSE PRACTITIONER

## 2019-04-16 NOTE — PROGRESS NOTES
SUBJECTIVE:  Pt is a 28 y.o.   at 29w6d  gestation. Presents today for follow-up prenatal care. Reports no issues at this time.  Reports good fetal movement. Denies cramping/contractions, bleeding or leaking of fluid. Denies dysuria, headaches, N/V, or other issues at this time. Reports feeling tailbone pain very intensely for which she has tried icy hot and massage. Fell on this area when not pregnant and pain has gotten worse with pregnancy.     OBJECTIVE:  - See prenatal vitals flow  -   Vitals:    19 1559   BP: 112/62   Weight: 79.8 kg (176 lb)                 ASSESSMENT:   - IUP at 29w6d    - S=D   -   Patient Active Problem List    Diagnosis Date Noted   • History of retained placenta in prior pregnancy, currently pregnant 2019   • Late prenatal care affecting pregnancy, antepartum 2019   • Supervision of other normal pregnancy 2019         PLAN:  - S/sx pregnancy and labor warning signs vs general discomforts discussed  - Fetal movements and kick counts reviewed   - Adequate hydration reinforced  - Nutrition/exercise/vitamin education; continued PN  - S/p TDAP vacc today  - S/p Flu vacc   - To try support belt, tylenol, warm packs and advise is not working and referral to physical therapy can be sent   - Anticipatory guidance given  - RTC in 2 weeks for follow-up prenatal care

## 2019-04-16 NOTE — PROGRESS NOTES
Pt. Here for OB/F/U, Kick Count sheet given and explained to pt.   Good FM  Good # [phone]  Pt states having nausea and dizziness.    Pharmacy verified.   Tdap vaccine given today, right  deltoid. Screening checklist reviewed with pt verified by Angelica MANN declined.

## 2019-04-16 NOTE — LETTER
Cuente los Movimientos de sanchez Bebé  Otro paso importante para la katherine de sanchez bebé    Eloina PRESLEY MERCADO     [facility]            Dept: [phone]    ¿Cuántas semanas tiene de embarazo? 29w6d    Fecha cuando tiene que comenzar a contar el movimiento: 4/16/19                  [place] debe usar laura diagrama    Cornelius manera en que sanchez doctor puede controlar a katherine de sanchez bebé es sabiendo cuantas veces se mueve sanchez bebé en el útero, o por medio de las “pataditas”.  Usted podrá ayudarle a sanchez médico al usar cada día el siguiente diagrama.    Cada día, usted debe prestar atención a cuantas horas le lleva a sanchez bebé moverse 10 veces.  Comience a contar en la mañana, lo antes posible después de haberse levantado.    · Primeramente, escriba la hora en que se mueve sanchez bebé, hasta llegar a 10 veces.  · Colóquele un check o palomita a cada cuadrito cada vez que sanchez bebé se mueva hasta que complete 10 veces.  · Escriba la hora cuando termine de contar 10 veces en la última columna.  · Sume el total del tiempo que le llevó contar los 10 movimientos.  · Finalmente, complete el cuadrito de cuantas horas le llevó hacerlo.    Después de reba contado los 10 movimientos, ya no tendrá que contar los demás movimientos por el bindu del día.  A la mañana siguiente, comience a contar de nuevo cuantas veces se mueve el bebé desde el momento en que se levante.    ¿Qué tendría que considerarse un “movimiento”?  Es difícil de decirlo porque es distinto de cornelius madre a otra, y de un embarazo a otro.  Lo importante es que cuente el movimiento de la misma manera scarlet el transcurso de sanchez embarazo.  Si tiene preguntas adicionales, pregúntele a sanchez doctor.    ¡Cuente cuidadosamente cada día!     MUESTRA:  Semana 28    ¿Cuántas horas le ha llevado sentir 10 movimientos?        Hora de Inicio     1     2     3     4     5     6     7     8     9     10   Hora de Finlizar   Carlitos. 8:20 ·  ·  ·  ·  ·  ·  ·  ·  ·  ·  11:40   Mar.               Mié.                Jue.               Vie.               Sáb.               Dom.                 IMPORTANTE:  Usted debe contactar a sanchez doctor si le lleva más de 2 horas sentir 10 movimientos de sanchez bebé.    Cada mañana, escriba la hora de inicio y comience a contar los movimientos de sanchez bebé.  Hágalo colocándole un check o palomita a cada cuadrito cada vez que sienta un movimiento de sanchez bebé.  Cuando haya sentido 10 “pataditas”, escriba la hora en que terminó de contar en la última columna.  Luego, complete en la cajita (arriba de la chon de check o palomita) el número total de horas que le llevó hacerlo.  Asegúrese de leer completamente las instrucciones en la página anterior.

## 2019-05-06 ENCOUNTER — ROUTINE PRENATAL (OUTPATIENT)
Dept: OBGYN | Facility: CLINIC | Age: 29
End: 2019-05-06

## 2019-05-06 VITALS — BODY MASS INDEX: 23.56 KG/M2 | WEIGHT: 181 LBS | SYSTOLIC BLOOD PRESSURE: 108 MMHG | DIASTOLIC BLOOD PRESSURE: 64 MMHG

## 2019-05-06 DIAGNOSIS — Z34.80 SUPERVISION OF OTHER NORMAL PREGNANCY, ANTEPARTUM: Primary | ICD-10-CM

## 2019-05-06 PROCEDURE — 90040 PR PRENATAL FOLLOW UP: CPT | Performed by: NURSE PRACTITIONER

## 2019-05-06 NOTE — PROGRESS NOTES
Pt here today for OB follow up  Pt states lower back pain and abdominal pain  Reports +FM  Good # [phone]  Pharmacy Confirmed.  Chaperone offered and provided.

## 2019-05-06 NOTE — PROGRESS NOTES
S) Pt is a 29 y.o.   at 32w5d  gestation. Routine prenatal care today. Complains of lower abdominal and back pain at the end of the day. Discussed normalcy of this and comfort measures reviewed. Discussed GBS at 35-36 weeks.  labor precautions discussed, all questions answered.    Fetal movement Normal  Cramping no  VB no  LOF no   Denies dysuria. Generally feels well today. Good self-care activities identified. Denies headaches, swelling, visual changes, or epigastric pain .     O) /64   Wt 82.1 kg (181 lb)         Labs:       PNL: WNL       GCT: 81        AFP: normal       GBS: N/A       Pertinent ultrasound -        19- Survey WNL, TRUE 16.0cm, c/w prev dating.     A) IUP at 32w5d       S=D         Patient Active Problem List    Diagnosis Date Noted   • Chronic back pain worsened by pregnancy  2019   • History of retained placenta in prior pregnancy, currently pregnant 2019   • Late prenatal care affecting pregnancy, antepartum 2019   • Supervision of other normal pregnancy 2019          SVE: deferred       Chaperone offered: n/a         TDAP: yes       FLU: yes        BTL: no       : n/a       C/S Consent: n/a       IOL or C/S scheduled: no       LAST PAP: 19- Negative         P) s/s ptl vs general discomforts. Fetal movements reviewed. General ed and anticipatory guidance. Nutrition/exercise/vitamin. Plans breast Plans pp contraception- unsure  Continue PNV.

## 2019-06-26 ENCOUNTER — TELEPHONE (OUTPATIENT)
Dept: OBGYN | Facility: CLINIC | Age: 29
End: 2019-06-26

## 2019-06-26 NOTE — TELEPHONE ENCOUNTER
Pt called requesting medical records, states she is going to delivery in [place].   Was advised to come to [facility] and fill out release of records or go to her new provider and sign it there./    Verbalized understanding.

## 2021-04-14 ENCOUNTER — HOSPITAL ENCOUNTER (OUTPATIENT)
Dept: RADIOLOGY | Facility: MEDICAL CENTER | Age: 31
End: 2021-04-14
Payer: MEDICAID

## 2022-07-05 ENCOUNTER — APPOINTMENT (OUTPATIENT)
Dept: RADIOLOGY | Facility: MEDICAL CENTER | Age: 32
End: 2022-07-05
Attending: EMERGENCY MEDICINE
Payer: COMMERCIAL

## 2022-07-05 ENCOUNTER — HOSPITAL ENCOUNTER (EMERGENCY)
Facility: MEDICAL CENTER | Age: 32
End: 2022-07-05
Attending: EMERGENCY MEDICINE
Payer: COMMERCIAL

## 2022-07-05 VITALS
SYSTOLIC BLOOD PRESSURE: 134 MMHG | DIASTOLIC BLOOD PRESSURE: 64 MMHG | TEMPERATURE: 98.3 F | WEIGHT: 167.99 LBS | HEART RATE: 76 BPM | OXYGEN SATURATION: 100 % | RESPIRATION RATE: 14 BRPM | HEIGHT: 69 IN | BODY MASS INDEX: 24.88 KG/M2

## 2022-07-05 DIAGNOSIS — S16.1XXA STRAIN OF NECK MUSCLE, INITIAL ENCOUNTER: ICD-10-CM

## 2022-07-05 PROCEDURE — 99284 EMERGENCY DEPT VISIT MOD MDM: CPT

## 2022-07-05 PROCEDURE — 72128 CT CHEST SPINE W/O DYE: CPT

## 2022-07-05 PROCEDURE — 72125 CT NECK SPINE W/O DYE: CPT

## 2022-07-05 PROCEDURE — 73030 X-RAY EXAM OF SHOULDER: CPT | Mod: RT

## 2022-07-05 RX ORDER — IBUPROFEN 800 MG/1
800 TABLET ORAL EVERY 8 HOURS PRN
Qty: 30 TABLET | Refills: 0 | Status: SHIPPED | OUTPATIENT
Start: 2022-07-05

## 2022-07-05 RX ORDER — CYCLOBENZAPRINE HCL 10 MG
10 TABLET ORAL 3 TIMES DAILY PRN
Qty: 30 TABLET | Refills: 0 | Status: SHIPPED | OUTPATIENT
Start: 2022-07-05
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
## 2022-07-05 NOTE — ED NOTES
Patient back with tech without assistance.  Patient in obvious discomfort and unable to turn her head.  Chart up for ERP

## 2022-07-05 NOTE — ED NOTES
CT called concerned about pregnancy test from other facility.  This was a low result and patient is not pregnant.  Patient to CT

## 2022-07-05 NOTE — ED NOTES
Patient given discharge instructions by ERP.  Patient denies further questions.  Patient discharged with all belongings